# Patient Record
Sex: FEMALE | Race: AMERICAN INDIAN OR ALASKA NATIVE
[De-identification: names, ages, dates, MRNs, and addresses within clinical notes are randomized per-mention and may not be internally consistent; named-entity substitution may affect disease eponyms.]

---

## 2018-06-12 ENCOUNTER — HOSPITAL ENCOUNTER (OUTPATIENT)
Dept: HOSPITAL 5 - GIO | Age: 63
Discharge: HOME | End: 2018-06-12
Attending: INTERNAL MEDICINE
Payer: COMMERCIAL

## 2018-06-12 VITALS — SYSTOLIC BLOOD PRESSURE: 126 MMHG | DIASTOLIC BLOOD PRESSURE: 75 MMHG

## 2018-06-12 DIAGNOSIS — J45.909: ICD-10-CM

## 2018-06-12 DIAGNOSIS — Z12.11: Primary | ICD-10-CM

## 2018-06-12 DIAGNOSIS — Z79.899: ICD-10-CM

## 2018-06-12 DIAGNOSIS — I10: ICD-10-CM

## 2018-06-12 DIAGNOSIS — G47.30: ICD-10-CM

## 2018-06-12 DIAGNOSIS — Z99.89: ICD-10-CM

## 2018-06-12 DIAGNOSIS — E66.01: ICD-10-CM

## 2018-06-12 DIAGNOSIS — K57.30: ICD-10-CM

## 2018-06-12 DIAGNOSIS — E11.9: ICD-10-CM

## 2018-06-12 DIAGNOSIS — D12.3: ICD-10-CM

## 2018-06-12 DIAGNOSIS — Z88.6: ICD-10-CM

## 2018-06-12 DIAGNOSIS — Z90.710: ICD-10-CM

## 2018-06-12 PROCEDURE — 82962 GLUCOSE BLOOD TEST: CPT

## 2018-06-12 PROCEDURE — 88305 TISSUE EXAM BY PATHOLOGIST: CPT

## 2018-06-12 PROCEDURE — 45385 COLONOSCOPY W/LESION REMOVAL: CPT

## 2018-06-12 NOTE — SHORT STAY SUMMARY
Short Stay Documentation


Date of service: 06/12/18


Narrative H&P: 





The patient presents for screening colonoscopy, average risks.  Last study 10 

years ago.





- History


Past Medical History: hypertension, other (morbid obesiey, sleep apnea 

requiring CPAP, asthmqa)


Past Surgical History: hysterectomy


Social history: no smoking, no alcohol abuse, no prescription drug abuse





- Allergies and Medications


Current Medications: 


 Allergies





ibuprofen [From Motrin] Allergy (Severe, Verified 06/12/18 07:16)


 Shortness of Breath





Active Medications





Sodium Chloride (Nacl 0.9% 1000 Ml)  1,000 mls @ 50 mls/hr IV AS DIRECT JENNIFER











- Physical exam


General appearance: no acute distress, well-nourished, obese


Integumentary: no rash, no growths, no abnormal pigmentation


HEENT: Atraumatic, PERRLA, EOMI, Mucous membr. moist/pink


Lungs: Clear to auscultation, Normal air movement


Heart: Regular rate, Normal S1, Normal S2, No murmurs


Gastrointestinal: normoactive bowel sounds, no tenderness, no distended, no 

masses, no organomegaly, no hepatomegaly, no splenomegaly, obese


Female Genitourinary: deferred


Rectal Exam: normal exam-external/orifice, no mass


Extremities: no ischemia, pulses intact, pulses symmetrical, No edema, normal 

temperature, Full ROM


Neurological: Normal gait, Normal speech, Strength at 5/5 X4 ext, Normal tone, 

Sensation intact, Cranial nerves 3-12 NL





- Brief post op/procedure progress note


Date of procedure: 06/12/18


Findings: 





see dictated report.


Estimated blood loss: none


Pathology: list (hepatic flexure polyp)


Specimen disposition: to lab


Condition: stable





- Disposition


Condition at discharge: Good


Disposition: DC-01 TO HOME OR SELFCARE





- Discharge Diagnoses


(1) Colon cancer screening


Status: Acute   





Short Stay Discharge Plan


Activity: other (no driving for 24 hours)


Weight Bearing Status: Full Weight Bearing


Diet: regular


Follow up with: 


AMBERLY SHIPLEY MD [Primary Care Provider] - 7 Days

## 2018-06-12 NOTE — OPERATIVE REPORT
Operative Report


Operative Report: 





Date of procedure: 06/12/2018





Preprocedure diagnosis: Colon cancer screening.  Last study 10 years ago.  

Average risk.





Post procedure diagnosis:8 mm pedunculated polyp in the hepatic flexure.  

Scattered left colon diverticula





Procedure: Colonoscopy to the cecum with hot snare polypectomy





Endoscopist: Dr. Howell





Anesthesia: Monitored anesthesia care per anesthesia department





Estimated blood loss: 0





Medications: Monitored anesthesia care.  See separate report by anesthesia for 

details.





After careful discussion of the nature and purpose of the procedure as well as 

details of the technique risks benefits and alternatives the patient gave 

consent.  Please see recent history and physical from the office.  The patient 

was placed in the left lateral decubitus position and medicated per anesthesia.

  A rectal exam was performed sphincter tone was normal there were no masses 

palpable.





The [employer] 570 scope was passed transanally and advanced under continuous 

direct vision without difficulty to the cecum.  The colon was well prepared.  

The cecum was normal.  The ascending colon was normal and on forward and 

retroflexed views.  There was an 8 mm pedunculated polyp in the hepatic flexure 

which was removed with snare and electrocautery and retrieved by suction.  The 

transverse colon was normal.  The descending colon and sigmoid colon revealed 

scattered diverticula.  The rectum was normal on forward and retroflexed views.

  The procedure was well-tolerated overall and the patient was observed in 

recovery.





Conclusions: 8 mm polyp in the hepatic flexure.  Scattered left colon 

diverticula.





Plan: Await pathology.  Repeat colonoscopy in 5 years, sooner if clinically 

indicated or indicated by pathology.








Signed electronically: Lester Howell M.D.

## 2018-06-12 NOTE — ANESTHESIA CONSULTATION
Anesthesia Consult and Med Hx


Date of service: 06/12/18





- Airway


Anesthetic Teeth Evaluation: Caps (removable front cap)


ROM Head & Neck: Adequate


Mental/Hyoid Distance: Adequate


Mallampati Class: Class I


Intubation Access Assessment: Good





- Pulmonary Exam


CTA: Yes





- Cardiac Exam


Cardiac Exam: RRR





- Pre-Operative Health Status


ASA Pre-Surgery Classification: ASA3


Proposed Anesthetic Plan: MAC





- Pulmonary


Hx Asthma: Yes (rare inhaler use )


Hx Sleep Apnea: Yes (cpap)





- Cardiovascular System


Hx Hypertension: Yes





- Endocrine


Hx Non-Insulin Dependent Diabetes: Yes





- Other Systems


Hx Obesity: Yes

## 2023-06-19 ENCOUNTER — DASHBOARD ENCOUNTERS (OUTPATIENT)
Age: 68
End: 2023-06-19

## 2023-06-19 ENCOUNTER — OFFICE VISIT (OUTPATIENT)
Dept: URBAN - METROPOLITAN AREA CLINIC 109 | Facility: CLINIC | Age: 68
End: 2023-06-19
Payer: COMMERCIAL

## 2023-06-19 ENCOUNTER — LAB OUTSIDE AN ENCOUNTER (OUTPATIENT)
Dept: URBAN - METROPOLITAN AREA CLINIC 109 | Facility: CLINIC | Age: 68
End: 2023-06-19

## 2023-06-19 VITALS
HEIGHT: 64 IN | DIASTOLIC BLOOD PRESSURE: 78 MMHG | SYSTOLIC BLOOD PRESSURE: 123 MMHG | HEART RATE: 71 BPM | BODY MASS INDEX: 49.51 KG/M2 | WEIGHT: 290 LBS

## 2023-06-19 DIAGNOSIS — Z86.010 HISTORY OF COLON POLYPS: ICD-10-CM

## 2023-06-19 DIAGNOSIS — I10 PRIMARY HYPERTENSION: ICD-10-CM

## 2023-06-19 DIAGNOSIS — E66.01 MORBID OBESITY: ICD-10-CM

## 2023-06-19 DIAGNOSIS — J45.909 ASTHMA, UNSPECIFIED ASTHMA SEVERITY, UNSPECIFIED WHETHER COMPLICATED, UNSPECIFIED WHETHER PERSISTENT: ICD-10-CM

## 2023-06-19 PROBLEM — 428283002: Status: ACTIVE | Noted: 2023-06-19

## 2023-06-19 PROBLEM — 238136002: Status: ACTIVE | Noted: 2023-06-19

## 2023-06-19 PROBLEM — 59621000: Status: ACTIVE | Noted: 2023-06-19

## 2023-06-19 PROBLEM — 195967001: Status: ACTIVE | Noted: 2023-06-19

## 2023-06-19 PROCEDURE — 99203 OFFICE O/P NEW LOW 30 MIN: CPT | Performed by: INTERNAL MEDICINE

## 2023-06-19 RX ORDER — HYDROCHLOROTHIAZIDE 25 MG/1
TABLET ORAL
Qty: 0 | Refills: 0 | Status: ACTIVE | COMMUNITY
Start: 1900-01-01

## 2023-06-19 RX ORDER — POLYETHYLENE GLYCOL-3350 AND ELECTROLYTES WITH FLAVOR PACK 240; 5.84; 2.98; 6.72; 22.72 G/278.26G; G/278.26G; G/278.26G; G/278.26G; G/278.26G
4000 ML AS DIRECTED POWDER, FOR SOLUTION ORAL
Qty: 4000 ML | Refills: 0 | OUTPATIENT
Start: 2023-06-19 | End: 2023-06-20

## 2023-06-19 NOTE — HPI-TODAY'S VISIT:
The patient has been referred for surveillance colonoscopy. She had an 8mm tubular adenoma on her last study in 2018. No FH of CRC. Denies change in bowel habits, blood in the stool, abdominal pain or weight loss.  PMH Asthma, HTN, obesity, prediabetes.

## 2023-09-05 ENCOUNTER — OFFICE VISIT (OUTPATIENT)
Dept: URBAN - METROPOLITAN AREA MEDICAL CENTER 41 | Facility: MEDICAL CENTER | Age: 68
End: 2023-09-05
Payer: COMMERCIAL

## 2023-09-05 DIAGNOSIS — Z09 CARDIOLOGY FOLLOW-UP ENCOUNTER: ICD-10-CM

## 2023-09-05 DIAGNOSIS — Z86.010 ADENOMAS PERSONAL HISTORY OF COLONIC POLYPS: ICD-10-CM

## 2023-09-05 PROCEDURE — G0105 COLORECTAL SCRN; HI RISK IND: HCPCS | Performed by: INTERNAL MEDICINE

## 2025-06-23 ENCOUNTER — OFFICE VISIT (OUTPATIENT)
Dept: URBAN - METROPOLITAN AREA CLINIC 109 | Facility: CLINIC | Age: 70
End: 2025-06-23

## 2025-06-25 ENCOUNTER — TELEPHONE ENCOUNTER (OUTPATIENT)
Dept: URBAN - METROPOLITAN AREA CLINIC 109 | Facility: CLINIC | Age: 70
End: 2025-06-25

## 2025-06-26 ENCOUNTER — TELEPHONE ENCOUNTER (OUTPATIENT)
Dept: URBAN - METROPOLITAN AREA CLINIC 118 | Facility: CLINIC | Age: 70
End: 2025-06-26

## 2025-06-26 ENCOUNTER — OFFICE VISIT (OUTPATIENT)
Dept: URBAN - METROPOLITAN AREA TELEHEALTH 2 | Facility: TELEHEALTH | Age: 70
End: 2025-06-26
Payer: COMMERCIAL

## 2025-06-26 ENCOUNTER — LAB OUTSIDE AN ENCOUNTER (OUTPATIENT)
Dept: URBAN - METROPOLITAN AREA TELEHEALTH 2 | Facility: TELEHEALTH | Age: 70
End: 2025-06-26

## 2025-06-26 DIAGNOSIS — Z86.0100 HISTORY OF COLON POLYPS: ICD-10-CM

## 2025-06-26 DIAGNOSIS — R19.5 POSITIVE COLORECTAL CANCER SCREENING USING COLOGUARD TEST: ICD-10-CM

## 2025-06-26 PROCEDURE — 99213 OFFICE O/P EST LOW 20 MIN: CPT

## 2025-06-26 RX ORDER — HYDROCHLOROTHIAZIDE 25 MG/1
1 TABLET IN THE MORNING TABLET ORAL ONCE A DAY
Refills: 0 | Status: ACTIVE | COMMUNITY
Start: 1900-01-01

## 2025-06-26 NOTE — HPI-TODAY'S VISIT:
Patient is a 69 year old female who with PMH of diabetes, HTN, and sleep apnea (on CPAP and 2L O2 at night) presents for diagnostic colonoscopy after +Cologuard result. No records available. Patient has no current GI concerns. Normal bowel habits- 3 soft BMs daily. Admits high fiber diet. Denies change in bowel habits, appetite, or weight. No abdominal pain or rectal bleeding. No UGI symptoms. No known family history of colon cancer or polyps. Denies cardiac or renal disease. Not on blood thinners.  Last colonoscopy 9/2023 with tics. No recurrent polyps.

## 2025-07-25 ENCOUNTER — OFFICE VISIT (OUTPATIENT)
Dept: URBAN - METROPOLITAN AREA MEDICAL CENTER 41 | Facility: MEDICAL CENTER | Age: 70
End: 2025-07-25

## 2025-07-25 RX ORDER — HYDROCHLOROTHIAZIDE 25 MG/1
1 TABLET IN THE MORNING TABLET ORAL ONCE A DAY
Refills: 0 | Status: ACTIVE | COMMUNITY
Start: 1900-01-01